# Patient Record
Sex: MALE | Race: OTHER | HISPANIC OR LATINO | URBAN - METROPOLITAN AREA
[De-identification: names, ages, dates, MRNs, and addresses within clinical notes are randomized per-mention and may not be internally consistent; named-entity substitution may affect disease eponyms.]

---

## 2020-04-09 ENCOUNTER — EMERGENCY (EMERGENCY)
Facility: HOSPITAL | Age: 40
LOS: 1 days | Discharge: HOME | End: 2020-04-09
Attending: PEDIATRICS | Admitting: INTERNAL MEDICINE
Payer: MEDICAID

## 2020-04-09 VITALS — TEMPERATURE: 99 F

## 2020-04-09 VITALS
HEART RATE: 114 BPM | SYSTOLIC BLOOD PRESSURE: 127 MMHG | TEMPERATURE: 99 F | RESPIRATION RATE: 22 BRPM | DIASTOLIC BLOOD PRESSURE: 84 MMHG

## 2020-04-09 PROCEDURE — 71045 X-RAY EXAM CHEST 1 VIEW: CPT | Mod: 26

## 2020-04-09 PROCEDURE — 99283 EMERGENCY DEPT VISIT LOW MDM: CPT

## 2020-04-09 NOTE — ED PROVIDER NOTE - CLINICAL SUMMARY MEDICAL DECISION MAKING FREE TEXT BOX
+ SOB and louise give 10 day of symptoms, likely COVID and plan to re-assess + SOB and louise give 10 day of symptoms, likely COVID and plan to re-assess, maintains sat with walking. will plan to dc home.

## 2020-04-09 NOTE — ED PROVIDER NOTE - PATIENT PORTAL LINK FT
You can access the FollowMyHealth Patient Portal offered by [facility] by registering at the following website: [email or web address]. By joining [facility]’s FollowMyHealth portal, you will also be able to view your health information using other applications (apps) compatible with our system.

## 2020-04-09 NOTE — ED PROVIDER NOTE - OBJECTIVE STATEMENT
39 yr old with history of cough and fever tmax 102, for 10 day and now with worsening SOB and cough + sick contact. no PMHx and no medications.

## 2020-04-09 NOTE — ED PROVIDER NOTE - NSFOLLOWUPINSTRUCTIONS_ED_ALL_ED_FT
-- Please avoid contact with others while you are symptomatic and ideally two weeks.  -- This is likely a virus.  There is no direct treatment for a virus, and antibiotics are not effective.     -- Rest, increase your fluid intake and avoid dehydration.  -- It is very important to be diligent about hand washing & hygiene to avoid spreading the illness to others.  -- If you are having any worsening shortness of breath, please see your doctor or return to the Emergency Department.  -- Please continue taking your home medications as directed.     Why didn’t I get tested for novel coronavirus (COVID-19)?    The number of available tests is very limited so strict rules exist for who is allowed to be tested. [facility] has been authorized to perform testing and is currently working hard to be able to start providing the test. Such testing is currently reserved for patients who have had contact with someone infected with the virus, or those who are very sick a plus those who have traveled to areas identified by the Centers for Disease Control and Prevention (CD) and will require hospitalization.     What should I do now?    If you are well enough to be discharged home and are not in a high risk group to have contracted the COVID-19, you should care for yourself at home exactly like you would if you have Influenza “flu”. Follow all the standard guidelines about washing your hands, covering your cough, etc. You should return to the Emergency Department if you develop worse symptoms, trouble breathing, chest pain, and/or a fever that doesn’t improve with over the counter acetaminophen or ibuprofen.

## 2024-01-29 NOTE — ED ADULT TRIAGE NOTE - ISOLATION TYPE:
Detail Level: Zone Photo Preface (Leave Blank If You Do Not Want): Photographs were obtained today Droplet+Contact precautions

## 2024-05-20 ENCOUNTER — OUTPATIENT (OUTPATIENT)
Dept: OUTPATIENT SERVICES | Facility: HOSPITAL | Age: 44
LOS: 1 days | End: 2024-05-20
Payer: MEDICAID

## 2024-05-20 PROBLEM — Z78.9 OTHER SPECIFIED HEALTH STATUS: Chronic | Status: ACTIVE | Noted: 2020-04-09

## 2024-05-20 PROCEDURE — 71046 X-RAY EXAM CHEST 2 VIEWS: CPT | Mod: 26

## 2024-05-20 PROCEDURE — 71046 X-RAY EXAM CHEST 2 VIEWS: CPT
